# Patient Record
Sex: MALE | Race: WHITE | NOT HISPANIC OR LATINO | Employment: PART TIME | URBAN - NONMETROPOLITAN AREA
[De-identification: names, ages, dates, MRNs, and addresses within clinical notes are randomized per-mention and may not be internally consistent; named-entity substitution may affect disease eponyms.]

---

## 2021-12-09 ENCOUNTER — OFFICE VISIT (OUTPATIENT)
Dept: INTERNAL MEDICINE | Facility: CLINIC | Age: 61
End: 2021-12-09

## 2021-12-09 VITALS
BODY MASS INDEX: 31.39 KG/M2 | WEIGHT: 200 LBS | SYSTOLIC BLOOD PRESSURE: 120 MMHG | HEIGHT: 67 IN | DIASTOLIC BLOOD PRESSURE: 80 MMHG | HEART RATE: 80 BPM | TEMPERATURE: 97.8 F | OXYGEN SATURATION: 98 % | RESPIRATION RATE: 16 BRPM

## 2021-12-09 DIAGNOSIS — Z12.5 PROSTATE CANCER SCREENING: ICD-10-CM

## 2021-12-09 DIAGNOSIS — G47.33 OSA (OBSTRUCTIVE SLEEP APNEA): ICD-10-CM

## 2021-12-09 DIAGNOSIS — Z12.11 COLON CANCER SCREENING: ICD-10-CM

## 2021-12-09 DIAGNOSIS — Z00.00 ROUTINE GENERAL MEDICAL EXAMINATION AT A HEALTH CARE FACILITY: Primary | ICD-10-CM

## 2021-12-09 PROCEDURE — 90471 IMMUNIZATION ADMIN: CPT | Performed by: INTERNAL MEDICINE

## 2021-12-09 PROCEDURE — 99386 PREV VISIT NEW AGE 40-64: CPT | Performed by: INTERNAL MEDICINE

## 2021-12-09 PROCEDURE — 90715 TDAP VACCINE 7 YRS/> IM: CPT | Performed by: INTERNAL MEDICINE

## 2021-12-09 NOTE — PROGRESS NOTES
"Subjective     Patient ID: London Feliz is a 61 y.o. male. Patient is here for management of multiple medical problems.     Chief Complaint   Patient presents with   • Annual Exam     History of Present Illness     Annual Exam    Moved here from [facility].  20 years. Lots of pollution.       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                  The following portions of the patient's history were reviewed and updated as appropriate: allergies, current medications, past family history, past medical history, past social history, past surgical history and problem list.    Review of Systems   Constitutional: Negative for diaphoresis and fatigue.   Psychiatric/Behavioral: Negative for self-injury and sleep disturbance. The patient is not nervous/anxious.    All other systems reviewed and are negative.    No current outpatient medications on file.    Objective      Blood pressure 120/80, pulse 80, temperature 97.8 °F (36.6 °C), resp. rate 16, height 170.2 cm (67\"), weight 90.7 kg (200 lb), SpO2 98 %.    Physical Exam     General Appearance:    Alert, cooperative, no distress, appears stated age   Head:    Normocephalic, without obvious abnormality, atraumatic   Eyes:    PERRL, conjunctiva/corneas clear, EOM's intact   Ears:    Normal TM's and external ear canals, both ears   Nose:   Nares normal, septum midline, mucosa normal, no drainage   or sinus tenderness   Throat:   Lips, mucosa, and tongue normal; teeth and gums normal   Neck:   Supple, symmetrical, trachea midline, no adenopathy;        thyroid:  No enlargement/tenderness/nodules; no carotid    bruit or JVD   Back:     Symmetric, no curvature, ROM normal, no CVA tenderness   Lungs:     Clear to auscultation bilaterally, respirations unlabored   Chest wall:    No tenderness or deformity   Heart:    Regular rate and rhythm, S1 and S2 normal, no murmur,        rub or gallop   Abdomen:     Soft, non-tender, bowel sounds active all four quadrants,     no masses, " no organomegaly   Extremities:   Extremities normal, atraumatic, no cyanosis or edema   Pulses:   2+ and symmetric all extremities   Skin:   Skin color, texture, turgor normal, no rashes or lesions   Lymph nodes:   Cervical, supraclavicular, and axillary nodes normal   Neurologic:   CNII-XII intact. Normal strength, sensation and reflexes       throughout      No results found for this or any previous visit.      Assessment/Plan       Diet discussed.    Exercise. Walking.     Get tdap.  Cologuard. Never had colonoscopy.            Diagnoses and all orders for this visit:    1. Routine general medical examination at a health care facility (Primary)  -     Lipid Panel  -     CBC & Differential  -     Vitamin B12  -     Comprehensive Metabolic Panel  -     TSH  -     Vitamin B6    2. Colon cancer screening  -     Cologuard - Stool, Per Rectum; Future  -     Lipid Panel  -     CBC & Differential  -     Vitamin B12  -     Comprehensive Metabolic Panel  -     TSH  -     Vitamin B6    3. Prostate cancer screening  -     PSA Screen    4. BASIA (obstructive sleep apnea)  -     Home Sleep Study    Other orders  -     Tdap Vaccine Greater Than or Equal To 8yo IM      Return in about 1 year (around 12/9/2022).          There are no Patient Instructions on file for this visit.     Harry Pandya MD    Assessment/Plan

## 2022-02-22 RX ORDER — BROMPHENIRAMINE MALEATE, PSEUDOEPHEDRINE HYDROCHLORIDE, AND DEXTROMETHORPHAN HYDROBROMIDE 2; 30; 10 MG/5ML; MG/5ML; MG/5ML
5 SYRUP ORAL 4 TIMES DAILY PRN
Qty: 118 ML | Refills: 2 | Status: SHIPPED | OUTPATIENT
Start: 2022-02-22 | End: 2023-01-18

## 2022-03-14 RX ORDER — BROMPHENIRAMINE MALEATE, PSEUDOEPHEDRINE HYDROCHLORIDE, AND DEXTROMETHORPHAN HYDROBROMIDE 2; 30; 10 MG/5ML; MG/5ML; MG/5ML
5 SYRUP ORAL 4 TIMES DAILY PRN
Qty: 118 ML | Refills: 2 | Status: SHIPPED | OUTPATIENT
Start: 2022-03-14 | End: 2023-01-18

## 2022-03-16 DIAGNOSIS — R05.9 COUGH: Primary | ICD-10-CM

## 2022-03-16 RX ORDER — GUAIFENESIN AND CODEINE PHOSPHATE 100; 10 MG/5ML; MG/5ML
5 SOLUTION ORAL 3 TIMES DAILY PRN
Qty: 118 ML | Refills: 0 | Status: SHIPPED | OUTPATIENT
Start: 2022-03-16 | End: 2023-03-24

## 2022-08-26 RX ORDER — PERMETHRIN 50 MG/G
1 CREAM TOPICAL ONCE
Qty: 60 G | Refills: 0 | Status: SHIPPED | OUTPATIENT
Start: 2022-08-26 | End: 2022-08-26

## 2023-01-18 ENCOUNTER — OFFICE VISIT (OUTPATIENT)
Dept: INTERNAL MEDICINE | Facility: CLINIC | Age: 63
End: 2023-01-18
Payer: COMMERCIAL

## 2023-01-18 VITALS
HEIGHT: 67 IN | DIASTOLIC BLOOD PRESSURE: 78 MMHG | HEART RATE: 71 BPM | OXYGEN SATURATION: 98 % | RESPIRATION RATE: 15 BRPM | SYSTOLIC BLOOD PRESSURE: 124 MMHG | WEIGHT: 200 LBS | TEMPERATURE: 98.6 F | BODY MASS INDEX: 31.39 KG/M2

## 2023-01-18 DIAGNOSIS — Z00.00 ROUTINE GENERAL MEDICAL EXAMINATION AT A HEALTH CARE FACILITY: Primary | ICD-10-CM

## 2023-01-18 DIAGNOSIS — Z12.5 PROSTATE CANCER SCREENING: ICD-10-CM

## 2023-01-18 PROCEDURE — 99396 PREV VISIT EST AGE 40-64: CPT | Performed by: INTERNAL MEDICINE

## 2023-01-18 NOTE — PROGRESS NOTES
"Subjective     Patient ID: London Feliz is a 62 y.o. male. Patient is here for management of multiple medical problems.     Chief Complaint   Patient presents with   • Annual Exam     Need some cough syrup wants to be proactive.  When body is at rest he is having some vision issues that is more Mirgrane symptoms    Lower back pain since 2018 and wants to know what the best therapy is for it, (pain meds)     History of Present Illness   Annual exam   Needs cough syrup.      vision issues. Laying in bed and had migraine.   3 in the last months. Vision related only.   Bright wavy line that gets worses and responds to tylenol.          The following portions of the patient's history were reviewed and updated as appropriate: allergies, current medications, past family history, past medical history, past social history, past surgical history and problem list.    Review of Systems   Constitutional: Negative for fatigue.   Cardiovascular: Negative for chest pain and leg swelling.   Psychiatric/Behavioral: Negative for self-injury and sleep disturbance.       Current Outpatient Medications:   •  guaiFENesin-codeine (GUAIFENESIN AC) 100-10 MG/5ML liquid, Take 5 mL by mouth 3 (Three) Times a Day As Needed for Cough., Disp: 118 mL, Rfl: 0    Objective      Blood pressure 124/78, pulse 71, temperature 98.6 °F (37 °C), resp. rate 15, height 170.2 cm (67.01\"), weight 90.7 kg (200 lb), SpO2 98 %.    Physical Exam     General Appearance:    Alert, cooperative, no distress, appears stated age   Head:    Normocephalic, without obvious abnormality, atraumatic   Eyes:    PERRL, conjunctiva/corneas clear, EOM's intact   Ears:    Normal TM's and external ear canals, both ears   Nose:   Nares normal, septum midline, mucosa normal, no drainage   or sinus tenderness   Throat:   Lips, mucosa, and tongue normal; teeth and gums normal   Neck:   Supple, symmetrical, trachea midline, no adenopathy;        thyroid:  No " enlargement/tenderness/nodules; no carotid    bruit or JVD   Back:     Symmetric, no curvature, ROM normal, no CVA tenderness   Lungs:     Clear to auscultation bilaterally, respirations unlabored   Chest wall:    No tenderness or deformity   Heart:    Regular rate and rhythm, S1 and S2 normal, no murmur,        rub or gallop   Abdomen:     Soft, non-tender, bowel sounds active all four quadrants,     no masses, no organomegaly   Extremities:   Extremities normal, atraumatic, no cyanosis or edema   Pulses:   2+ and symmetric all extremities   Skin:   Skin color, texture, turgor normal, no rashes or lesions   Lymph nodes:   Cervical, supraclavicular, and axillary nodes normal   Neurologic:   CNII-XII intact. Normal strength, sensation and reflexes       throughout      No results found for this or any previous visit.      Assessment & Plan   koki mild with ahi. And pt has lost wt. No symptoms.        Diagnoses and all orders for this visit:    1. Routine general medical examination at a health care facility (Primary)  -     TSH  -     Comprehensive Metabolic Panel  -     PSA Screen  -     Lipid Panel  -     CBC & Differential    2. Prostate cancer screening  -     TSH  -     Comprehensive Metabolic Panel  -     PSA Screen  -     Lipid Panel  -     CBC & Differential      Return in about 1 year (around 1/18/2024).          There are no Patient Instructions on file for this visit.     Harry Pandya MD    Assessment & Plan

## 2023-03-24 ENCOUNTER — OFFICE VISIT (OUTPATIENT)
Dept: INTERNAL MEDICINE | Facility: CLINIC | Age: 63
End: 2023-03-24
Payer: COMMERCIAL

## 2023-03-24 VITALS
WEIGHT: 195.8 LBS | HEART RATE: 82 BPM | BODY MASS INDEX: 30.73 KG/M2 | OXYGEN SATURATION: 95 % | HEIGHT: 67 IN | TEMPERATURE: 96.5 F | DIASTOLIC BLOOD PRESSURE: 86 MMHG | SYSTOLIC BLOOD PRESSURE: 130 MMHG

## 2023-03-24 DIAGNOSIS — R21 RASH AND NONSPECIFIC SKIN ERUPTION: Primary | ICD-10-CM

## 2023-03-24 DIAGNOSIS — N50.89 SWELLING OF RIGHT TESTICLE: ICD-10-CM

## 2023-03-24 PROCEDURE — 99214 OFFICE O/P EST MOD 30 MIN: CPT | Performed by: NURSE PRACTITIONER

## 2023-03-24 RX ORDER — TRIAMCINOLONE ACETONIDE 1 MG/G
1 CREAM TOPICAL 2 TIMES DAILY
Qty: 45 G | Refills: 3 | Status: SHIPPED | OUTPATIENT
Start: 2023-03-24

## 2023-03-24 NOTE — PROGRESS NOTES
Answers for HPI/ROS submitted by the patient on 3/17/2023  What is the primary reason for your visit?: Other  Please describe your symptoms.: Long story. Last September, I got about 300-400 chigger bites on my ankles, legs and some on chest and back. VERY itchy. The problem is that the itch really doesn't seem to have gone away, and there are rash marks and welts. Not pleasant. The cream the  gave me some time ago really didn't do anything at all.  Have you had these symptoms before?: No  How long have you been having these symptoms?: Greater than 2 weeks  Please list any medications you are currently taking for this condition.: Sometimes I'll take antihistamine to help with itch. Also using Aveeno nightime itch balm. I'll also sometimes use Desitin on a bandaid. Some have faded a bit.  Please describe any probable cause for these symptoms. : Again, the whole thing started last  when I was (nearly) eaten alive by chiggers.      Office Visit      Patient Name: London Feliz  : 1960   MRN: [id number]   Care Team: Patient Care Team:  Harry Pandya MD as PCP - General (Internal Medicine)    Chief Complaint  Rash (Left ankle /Itching )    Subjective     Subjective      London Feliz presents to [facility] for rash and testicle problem.  Complaining of a pruritic erythematous rash over the last 8 to 9 months.  Originally from chiggers or so he thought, did improve with application of Vicks VapoRub.  PCP gave him permethrin cream to try, this did not help at all.  He continues to have a waxing and waning rash that is erythematous with papules and pruritic.  The rash does not drain anything he has not had a fever, joint pain, or excess fatigue.  He has tried over-the-counter creams which have not helped.  The rash is starting to spread, he does have 1 localized area on his trunk the rest are located on his lower extremities.  He has never had a similar rash to this prior  "to this incident.  He is also complaining of possible testicular mass today.  Thought he felt something abnormal on the right testicle.  He denies any pain, hematuria, frequency, urgency, or dysuria.  He has not tried anything for the problem.  Has noticed in the last 3 days..    Objective     Objective   Vital Signs:   /86   Pulse 82   Temp 96.5 °F (35.8 °C)   Ht 170.2 cm (67.01\")   Wt 88.8 kg (195 lb 12.8 oz)   SpO2 95%   BMI 30.66 kg/m²     Physical Exam  Vitals and nursing note reviewed. Exam conducted with a chaperone present (ERIK Saavedra).   Constitutional:       General: He is not in acute distress.     Appearance: Normal appearance. He is not toxic-appearing.   Eyes:      Pupils: Pupils are equal, round, and reactive to light.   Cardiovascular:      Rate and Rhythm: Normal rate and regular rhythm.      Heart sounds: Normal heart sounds. No murmur heard.  Pulmonary:      Effort: Pulmonary effort is normal. No respiratory distress.      Breath sounds: Normal breath sounds. No wheezing.   Genitourinary:     Testes: Cremasteric reflex is present.         Right: Mass, tenderness or swelling not present.         Left: Mass, tenderness or swelling not present.      Epididymis:      Right: Normal.      Left: Normal.   Skin:     General: Skin is warm and dry.      Findings: Rash present. Rash is papular (Papular annular rash on lower extremities not draining or warm rolled borders, 1 area on abdomen as marked).          Neurological:      Mental Status: He is alert.   Psychiatric:         Mood and Affect: Mood normal.         Behavior: Behavior normal.        Assessment / Plan      Assessment & Plan   Problem List Items Addressed This Visit    None  Visit Diagnoses     Rash and nonspecific skin eruption    -  Primary    Relevant Medications    triamcinolone (KENALOG) 0.1 % cream    Unclear etiology, differentials include plaque psoriasis versus eczema.  Low suspicion for fungal infection.  Trial " triamcinolone twice daily at least 7 days if not improving consider trial of clotrimazole and refer to dermatology for further work-up.  Avoid scented lotions, Free and clear laundry detergent recommended, and avoid scratching.    Swelling of right testicle        Relevant Orders    US scrotum and testicles    I do not endorse enlargement or mass of the right testicle, and due to the symptoms he is concerned for underlying testicular cancer.  Advised this would be rare but can further evaluate with ultrasound, noninvasive fairly easy test.  No red flags to consider stat imaging.  Ordered today.  Monitor symptoms.          Follow Up   Return if symptoms worsen or fail to improve.  Patient was given instructions and counseling regarding his condition or for health maintenance advice. Please see specific information pulled into the AVS if appropriate.     JAZZY Che  [facility]

## 2023-03-25 LAB
ALBUMIN SERPL-MCNC: 4 G/DL (ref 3.8–4.8)
ALBUMIN/GLOB SERPL: 1.7 {RATIO} (ref 1.2–2.2)
ALP SERPL-CCNC: 74 IU/L (ref 44–121)
ALT SERPL-CCNC: 21 IU/L (ref 0–44)
AST SERPL-CCNC: 19 IU/L (ref 0–40)
BASOPHILS # BLD AUTO: 0.1 X10E3/UL (ref 0–0.2)
BASOPHILS NFR BLD AUTO: 1 %
BILIRUB SERPL-MCNC: 0.3 MG/DL (ref 0–1.2)
BUN SERPL-MCNC: 13 MG/DL (ref 8–27)
BUN/CREAT SERPL: 12 (ref 10–24)
CALCIUM SERPL-MCNC: 9.7 MG/DL (ref 8.6–10.2)
CHLORIDE SERPL-SCNC: 103 MMOL/L (ref 96–106)
CHOLEST SERPL-MCNC: 200 MG/DL (ref 100–199)
CO2 SERPL-SCNC: 26 MMOL/L (ref 20–29)
CREAT SERPL-MCNC: 1.05 MG/DL (ref 0.76–1.27)
EGFRCR SERPLBLD CKD-EPI 2021: 80 ML/MIN/1.73
EOSINOPHIL # BLD AUTO: 0.2 X10E3/UL (ref 0–0.4)
EOSINOPHIL NFR BLD AUTO: 2 %
ERYTHROCYTE [DISTWIDTH] IN BLOOD BY AUTOMATED COUNT: 13 % (ref 11.6–15.4)
GLOBULIN SER CALC-MCNC: 2.4 G/DL (ref 1.5–4.5)
GLUCOSE SERPL-MCNC: 64 MG/DL (ref 70–99)
HCT VFR BLD AUTO: 49.6 % (ref 37.5–51)
HDLC SERPL-MCNC: 33 MG/DL
HGB BLD-MCNC: 17 G/DL (ref 13–17.7)
IMM GRANULOCYTES # BLD AUTO: 0 X10E3/UL (ref 0–0.1)
IMM GRANULOCYTES NFR BLD AUTO: 0 %
LDLC SERPL CALC-MCNC: 133 MG/DL (ref 0–99)
LYMPHOCYTES # BLD AUTO: 2.6 X10E3/UL (ref 0.7–3.1)
LYMPHOCYTES NFR BLD AUTO: 37 %
MCH RBC QN AUTO: 29.6 PG (ref 26.6–33)
MCHC RBC AUTO-ENTMCNC: 34.3 G/DL (ref 31.5–35.7)
MCV RBC AUTO: 86 FL (ref 79–97)
MONOCYTES # BLD AUTO: 0.6 X10E3/UL (ref 0.1–0.9)
MONOCYTES NFR BLD AUTO: 9 %
NEUTROPHILS # BLD AUTO: 3.5 X10E3/UL (ref 1.4–7)
NEUTROPHILS NFR BLD AUTO: 51 %
PLATELET # BLD AUTO: 286 X10E3/UL (ref 150–450)
POTASSIUM SERPL-SCNC: 5.1 MMOL/L (ref 3.5–5.2)
PROT SERPL-MCNC: 6.4 G/DL (ref 6–8.5)
PSA SERPL-MCNC: 3.6 NG/ML (ref 0–4)
RBC # BLD AUTO: 5.74 X10E6/UL (ref 4.14–5.8)
SODIUM SERPL-SCNC: 141 MMOL/L (ref 134–144)
TRIGL SERPL-MCNC: 189 MG/DL (ref 0–149)
TSH SERPL DL<=0.005 MIU/L-ACNC: 3.91 UIU/ML (ref 0.45–4.5)
VLDLC SERPL CALC-MCNC: 34 MG/DL (ref 5–40)
WBC # BLD AUTO: 7 X10E3/UL (ref 3.4–10.8)

## 2023-05-26 RX ORDER — DEXTROMETHORPHAN HYDROBROMIDE AND PROMETHAZINE HYDROCHLORIDE 15; 6.25 MG/5ML; MG/5ML
5 SYRUP ORAL 4 TIMES DAILY PRN
Qty: 120 ML | Refills: 5 | Status: SHIPPED | OUTPATIENT
Start: 2023-05-26

## 2023-06-01 RX ORDER — DEXTROMETHORPHAN HYDROBROMIDE AND PROMETHAZINE HYDROCHLORIDE 15; 6.25 MG/5ML; MG/5ML
5 SYRUP ORAL 4 TIMES DAILY PRN
Qty: 120 ML | Refills: 5 | OUTPATIENT
Start: 2023-06-01

## 2023-10-25 RX ORDER — NYSTATIN 100000 [USP'U]/G
POWDER TOPICAL 4 TIMES DAILY
Qty: 60 G | Refills: 1 | Status: SHIPPED | OUTPATIENT
Start: 2023-10-25

## 2023-10-25 RX ORDER — FLUCONAZOLE 100 MG/1
100 TABLET ORAL DAILY
Qty: 1 TABLET | Refills: 0 | Status: SHIPPED | OUTPATIENT
Start: 2023-10-25

## 2024-04-22 RX ORDER — DEXTROMETHORPHAN HYDROBROMIDE AND PROMETHAZINE HYDROCHLORIDE 15; 6.25 MG/5ML; MG/5ML
5 SYRUP ORAL 4 TIMES DAILY PRN
Qty: 120 ML | Refills: 5 | OUTPATIENT
Start: 2024-04-22

## 2024-04-22 NOTE — TELEPHONE ENCOUNTER
Sent patient a [employer] message, last office visit was 03/24/2024 informed patient he will need an office visit for refill.

## 2024-07-01 ENCOUNTER — OFFICE VISIT (OUTPATIENT)
Dept: FAMILY MEDICINE CLINIC | Facility: CLINIC | Age: 64
End: 2024-07-01
Payer: COMMERCIAL

## 2024-07-01 VITALS
BODY MASS INDEX: 31.01 KG/M2 | DIASTOLIC BLOOD PRESSURE: 84 MMHG | TEMPERATURE: 98 F | HEIGHT: 67 IN | SYSTOLIC BLOOD PRESSURE: 124 MMHG | RESPIRATION RATE: 16 BRPM | OXYGEN SATURATION: 97 % | HEART RATE: 78 BPM | WEIGHT: 197.6 LBS

## 2024-07-01 DIAGNOSIS — Z12.5 SCREENING PSA (PROSTATE SPECIFIC ANTIGEN): ICD-10-CM

## 2024-07-01 DIAGNOSIS — R05.9 COUGH, UNSPECIFIED TYPE: ICD-10-CM

## 2024-07-01 DIAGNOSIS — L40.9 PSORIASIS: ICD-10-CM

## 2024-07-01 DIAGNOSIS — Z11.59 NEED FOR HEPATITIS C SCREENING TEST: ICD-10-CM

## 2024-07-01 DIAGNOSIS — Z00.00 WELL ADULT EXAM: Primary | ICD-10-CM

## 2024-07-01 DIAGNOSIS — E66.09 CLASS 1 OBESITY DUE TO EXCESS CALORIES WITH BODY MASS INDEX (BMI) OF 30.0 TO 30.9 IN ADULT, UNSPECIFIED WHETHER SERIOUS COMORBIDITY PRESENT: ICD-10-CM

## 2024-07-01 PROCEDURE — 99213 OFFICE O/P EST LOW 20 MIN: CPT | Performed by: FAMILY MEDICINE

## 2024-07-01 PROCEDURE — 99396 PREV VISIT EST AGE 40-64: CPT | Performed by: FAMILY MEDICINE

## 2024-07-01 RX ORDER — MULTIPLE VITAMINS W/ MINERALS TAB 9MG-400MCG
1 TAB ORAL DAILY
COMMUNITY

## 2024-07-02 LAB
ALBUMIN SERPL-MCNC: 4.2 G/DL (ref 3.5–5.2)
ALBUMIN/GLOB SERPL: 1.5 G/DL
ALP SERPL-CCNC: 68 U/L (ref 39–117)
ALT SERPL-CCNC: 16 U/L (ref 1–41)
AST SERPL-CCNC: 17 U/L (ref 1–40)
BILIRUB SERPL-MCNC: 0.3 MG/DL (ref 0–1.2)
BUN SERPL-MCNC: 16 MG/DL (ref 8–23)
BUN/CREAT SERPL: 16.2 (ref 7–25)
CALCIUM SERPL-MCNC: 9.8 MG/DL (ref 8.6–10.5)
CHLORIDE SERPL-SCNC: 101 MMOL/L (ref 98–107)
CHOLEST SERPL-MCNC: 212 MG/DL (ref 0–200)
CO2 SERPL-SCNC: 26.1 MMOL/L (ref 22–29)
CREAT SERPL-MCNC: 0.99 MG/DL (ref 0.76–1.27)
EGFRCR SERPLBLD CKD-EPI 2021: 85.6 ML/MIN/1.73
ERYTHROCYTE [DISTWIDTH] IN BLOOD BY AUTOMATED COUNT: 13 % (ref 12.3–15.4)
GLOBULIN SER CALC-MCNC: 2.8 GM/DL
GLUCOSE SERPL-MCNC: 89 MG/DL (ref 65–99)
HCT VFR BLD AUTO: 50.1 % (ref 37.5–51)
HCV IGG SERPL QL IA: NON REACTIVE
HDLC SERPL-MCNC: 39 MG/DL (ref 40–60)
HGB BLD-MCNC: 16.8 G/DL (ref 13–17.7)
LDLC SERPL CALC-MCNC: 145 MG/DL (ref 0–100)
MCH RBC QN AUTO: 29.4 PG (ref 26.6–33)
MCHC RBC AUTO-ENTMCNC: 33.5 G/DL (ref 31.5–35.7)
MCV RBC AUTO: 87.6 FL (ref 79–97)
PLATELET # BLD AUTO: 303 10*3/MM3 (ref 140–450)
POTASSIUM SERPL-SCNC: 4.4 MMOL/L (ref 3.5–5.2)
PROT SERPL-MCNC: 7 G/DL (ref 6–8.5)
PSA SERPL-MCNC: 5.24 NG/ML (ref 0–4)
RBC # BLD AUTO: 5.72 10*6/MM3 (ref 4.14–5.8)
SODIUM SERPL-SCNC: 139 MMOL/L (ref 136–145)
TRIGL SERPL-MCNC: 153 MG/DL (ref 0–150)
VLDLC SERPL CALC-MCNC: 28 MG/DL (ref 5–40)
WBC # BLD AUTO: 7.25 10*3/MM3 (ref 3.4–10.8)

## 2024-07-05 ENCOUNTER — PATIENT ROUNDING (BHMG ONLY) (OUTPATIENT)
Dept: FAMILY MEDICINE CLINIC | Facility: CLINIC | Age: 64
End: 2024-07-05
Payer: COMMERCIAL

## 2024-07-08 DIAGNOSIS — R97.20 ELEVATED PSA, LESS THAN 10 NG/ML: Primary | ICD-10-CM

## 2024-07-12 PROBLEM — E66.09 CLASS 1 OBESITY DUE TO EXCESS CALORIES WITH BODY MASS INDEX (BMI) OF 30.0 TO 30.9 IN ADULT: Status: ACTIVE | Noted: 2024-07-12

## 2024-07-12 PROBLEM — R05.9 COUGH: Status: ACTIVE | Noted: 2024-07-12

## 2024-07-12 PROBLEM — E66.811 CLASS 1 OBESITY DUE TO EXCESS CALORIES WITH BODY MASS INDEX (BMI) OF 30.0 TO 30.9 IN ADULT: Status: ACTIVE | Noted: 2024-07-12

## 2024-07-12 PROBLEM — L40.9 PSORIASIS: Status: ACTIVE | Noted: 2024-07-12

## 2024-07-12 NOTE — PROGRESS NOTES
Male Physical Note      Date: 2024   Patient Name: London Feliz  : 1960   MRN: [id number]     Chief Complaint:    Chief Complaint   Patient presents with    Establish Care     Pt is here to establish care. Pt needs a PCP closer to home. Pt has some issues he would like to continue care with but nothing major he needs to go over today.     Psoriasis     Pt has some issue with skin but its not a big problem and does not see an derm.     Cough     Pt states for years he gets a cough at night and can not sleep when he gets a cold. Nothing OTC has ever helped except a cough syrup with codeine. No currently having this issue but wanted PCP to be aware that it comes and goes.         History of Present Illness: London Feliz is a 63 y.o. male who is here today for their annual health maintenance and physical.  He is establishing care. Has hx of psoriasis and obesity. Psoriasis currently well controlled with moisturizing lotions and occasional topical CS.     Patient c/o cough that is worse at night. Prevents him from sleeping well. Reports that cough has not really been helped with anything but cough syrup with codeine. He is utilizing mucinex. No hx of asthma. Cough comes and goes.      Subjective      I have reviewed the following portions of the patient's history and these were updated and discussed with the patient as appropriate: past family history, past medical history, past social history, past surgical history, and problem list.      Medications:     Current Outpatient Medications:     multivitamin with minerals tablet tablet, Take 1 tablet by mouth Daily., Disp: , Rfl:     triamcinolone (KENALOG) 0.1 % cream, Apply 1 application topically to the appropriate area as directed 2 (Two) Times a Day., Disp: 45 g, Rfl: 3    Allergies:   No Known Allergies    Immunizations:  Immunization History   Administered Date(s) Administered    Tdap 2021      Colorectal Screening:     Last Completed  "Colonoscopy            COLORECTAL CANCER SCREENING (COLOGUARD - Every 3 Years) Next due on 8/29/2025 08/29/2022  SCANNED - COLOGUARD    08/29/2022  Cologuard - ,    12/09/2021  Cologuard - Stool, Per Rectum                   Diet/Physical activity: Is working to improve this and make this more balanced.      PHQ-9 Depression Screening  Little interest or pleasure in doing things? 0-->not at all   Feeling down, depressed, or hopeless? 0-->not at all   Trouble falling or staying asleep, or sleeping too much?     Feeling tired or having little energy?     Poor appetite or overeating?     Feeling bad about yourself - or that you are a failure or have let yourself or your family down?     Trouble concentrating on things, such as reading the newspaper or watching television?     Moving or speaking so slowly that other people could have noticed? Or the opposite - being so fidgety or restless that you have been moving around a lot more than usual?     Thoughts that you would be better off dead, or of hurting yourself in some way?     PHQ-9 Total Score 0   If you checked off any problems, how difficult have these problems made it for you to do your work, take care of things at home, or get along with other people?       Objective     Physical Exam:  Vital Signs:   Vitals:    07/01/24 1006   BP: 124/84   Pulse: 78   Resp: 16   Temp: 98 °F (36.7 °C)   TempSrc: Temporal   SpO2: 97%   Weight: 89.6 kg (197 lb 9.6 oz)   Height: 170.2 cm (67.01\")     Body mass index is 30.94 kg/m².     Physical Exam  Vitals reviewed.   Constitutional:       General: He is not in acute distress.     Appearance: Normal appearance. He is obese. He is not ill-appearing.   HENT:      Head: Normocephalic and atraumatic.      Right Ear: Tympanic membrane, ear canal and external ear normal.      Left Ear: Tympanic membrane, ear canal and external ear normal.      Nose: Nose normal. No congestion or rhinorrhea.      Mouth/Throat:      Mouth: Mucous " membranes are moist.      Pharynx: No oropharyngeal exudate or posterior oropharyngeal erythema.   Eyes:      General: No scleral icterus.        Right eye: No discharge.         Left eye: No discharge.      Extraocular Movements: Extraocular movements intact.      Conjunctiva/sclera: Conjunctivae normal.   Cardiovascular:      Rate and Rhythm: Normal rate and regular rhythm.      Heart sounds: Normal heart sounds. No murmur heard.  Pulmonary:      Effort: Pulmonary effort is normal. No respiratory distress.      Breath sounds: Normal breath sounds. No stridor. No wheezing or rales.   Abdominal:      General: Bowel sounds are normal. There is no distension.      Palpations: Abdomen is soft. There is no mass.      Tenderness: There is no abdominal tenderness. There is no guarding or rebound.      Hernia: No hernia is present.   Musculoskeletal:         General: Normal range of motion.      Cervical back: Normal range of motion.      Right lower leg: No edema.      Left lower leg: No edema.   Skin:     General: Skin is warm and dry.   Neurological:      Mental Status: He is alert and oriented to person, place, and time. Mental status is at baseline.   Psychiatric:         Mood and Affect: Mood normal.         Behavior: Behavior normal.         Thought Content: Thought content normal.         Judgment: Judgment normal.         Procedures    Assessment / Plan      Assessment/Plan:   Diagnoses and all orders for this visit:    1. Well adult exam (Primary)  -     CBC (No Diff)  -     Comprehensive Metabolic Panel  -     Lipid Panel  -     PSA Screen    2. Screening PSA (prostate specific antigen)  -     PSA Screen    3. Need for hepatitis C screening test  -     Hepatitis C Antibody    4. Class 1 obesity due to excess calories with body mass index (BMI) of 30.0 to 30.9 in adult, unspecified whether serious comorbidity present    5. Cough, unspecified type    6. Psoriasis       Labs as per above  PSA screening.  There is  family history of BPH and prostate cancer.  Diet and exercise recommendations in AVS  Patient up-to-date on colon cancer screening but will need to repeat in 2025.  Cough likely secondary to allergic rhinitis.  Discussed starting fluticasone and oral antihistamine to help with cough.  Patient also utilize OTC H2 blocker.  Will follow-up if this does not help with his cough.  Psoriasis well-controlled at this time but if patient needs refills will refill topical corticosteroid.    BMI is >= 30 and <35. (Class 1 Obesity). The following options were offered after discussion;: exercise counseling/recommendations and nutrition counseling/recommendations       Follow Up:   Return in about 1 year (around 7/1/2025) for Annual.    Healthcare Maintenance:   Counseling provided on exercise, nutrition, age-appropriate screening test, and appropriate lab studies.   London Feliz voices understanding and acceptance of this advice and will call back with any further questions or concerns. AVS with preventive healthcare tips printed for patient.     Pankaj Robin MD   [facility] [place]

## 2024-07-17 RX ORDER — TRIAMCINOLONE ACETONIDE 1 MG/G
1 CREAM TOPICAL 2 TIMES DAILY
Qty: 45 G | Refills: 3 | Status: SHIPPED | OUTPATIENT
Start: 2024-07-17

## 2024-07-23 ENCOUNTER — OFFICE VISIT (OUTPATIENT)
Dept: UROLOGY | Facility: CLINIC | Age: 64
End: 2024-07-23
Payer: COMMERCIAL

## 2024-07-23 VITALS
WEIGHT: 196.4 LBS | HEART RATE: 88 BPM | BODY MASS INDEX: 29.09 KG/M2 | HEIGHT: 69 IN | SYSTOLIC BLOOD PRESSURE: 142 MMHG | TEMPERATURE: 98.1 F | DIASTOLIC BLOOD PRESSURE: 82 MMHG | OXYGEN SATURATION: 98 % | RESPIRATION RATE: 12 BRPM

## 2024-07-23 DIAGNOSIS — R97.20 ELEVATED PSA, LESS THAN 10 NG/ML: Primary | ICD-10-CM

## 2024-07-23 PROCEDURE — 99214 OFFICE O/P EST MOD 30 MIN: CPT | Performed by: NURSE PRACTITIONER

## 2024-07-23 PROCEDURE — 51798 US URINE CAPACITY MEASURE: CPT | Performed by: NURSE PRACTITIONER

## 2024-07-23 NOTE — PROGRESS NOTES
Office Visit Elevated PSA     Patient Name: London Feliz  : 1960   MRN: [id number]     Chief Complaint: Elevated PSA.    Chief Complaint   Patient presents with    Elevated PSA    Establish Care       Referring Provider: Pankaj Robin MD    History of Present Illness: Mr. London Feliz is a 63 y.o.  male who presents with an elevated PSA to   PSA          2024    11:26   PSA   PSA 5.240    .      Patient complains of daytime frequency, intermittency, urgency, weak stream, straining, and nocturia.  He is mostly satisfied with his urinary symptoms.    His IPSS score is 14.    IPSS Questionnaire (AUA-7):  Incomplete emptying  Over the past month, how often have you had a sensation of not emptying your bladder completely after you finished urinating?: Less than 1 time in 5 (24)  Frequency  Over the past month, how often have you had to urinate again less than two hours after you finishied urinating ?: More than half the time (24)  Intermittency  Over the past month, how often have you found you stopped and started again several time when you urinated ?: Less than half the time (24)  Urgency  Over the last month, how often have you found it difficult  have you found it difficult to postpone urination ?: Less than half the time (24)  Weak Stream  Over the past month, how often have you had a weak urinary stream ?: Less than half the time (24)  Straining  Over the past month, how often have you had to push or strain to begin urination ?: Less than half the time (24)  Nocturia  Over the past month, how many times did you most typically get up to urinate from the time you went to bed until the time you got up in the morning ?: 1 time (24)  Quality of life due to urinary symptoms  If you were to spend the rest of your life with your urinary condition the way it is now, how would feel about that?: Mostly satisfied  (24 1527)    Scores  Total IPSS Score: (!) 14 (24 152)  Total Score = Symptomatic Level: Moderately symptomatic: 8-19 (24)        Patient denies fevers, chills, nausea, vomiting, constipation, or flank pain, shortness of breath, leg swelling, calf pain or bone pain.      Past  history is negative for BPH, frequent UTIs, gross hematuria, stones or other renal diseases. Assumes that he has an enlarged prostate but has not been told he does.      Subjective      Review of System: ROS reviewed by me and is negative unless otherwise noted in HPI.      Past Medical History:   Past Medical History:   Diagnosis Date    Cataract diagnosed several years ago    Elevated PSA     GERD (gastroesophageal reflux disease)     Lumbar herniated disc     Migraines        Past Surgical History:   Past Surgical History:   Procedure Laterality Date    VARICOCELE EXCISION      surgery       Family History:   Family History   Problem Relation Age of Onset    Migraines Father     Arthritis Mother     Colon cancer Mother     Migraines Mother     Cancer Mother         colon    Prostate cancer Neg Hx     Kidney cancer Neg Hx      Has no family history of prostate cancer.    Social History:   Social History     Socioeconomic History    Marital status:     Number of children: 7   Tobacco Use    Smoking status: Former     Types: Cigars     Quit date:      Years since quittin.5     Passive exposure: Never    Smokeless tobacco: Never    Tobacco comments:     used to smoke a cigar now and then; gave it up   Vaping Use    Vaping status: Never Used   Substance and Sexual Activity    Alcohol use: Not Currently     Comment: glass wine once in while    Drug use: Never    Sexual activity: Yes     Partners: Female     Comment: NA       Medications:     Current Outpatient Medications:     Multiple Vitamins-Minerals (EMERGEN-C VITAMIN C PO), Take  by mouth., Disp: , Rfl:     multivitamin with minerals tablet  "tablet, Take 1 tablet by mouth Daily., Disp: , Rfl:     triamcinolone (KENALOG) 0.1 % cream, Apply 1 Application topically to the appropriate area as directed 2 (Two) Times a Day., Disp: 45 g, Rfl: 3    Allergies:   No Known Allergies    Objective     Physical Exam:   Vital Signs:   Vitals:    07/23/24 1524   BP: 142/82   BP Location: Left arm   Patient Position: Sitting   Cuff Size: Adult   Pulse: 88   Resp: 12   Temp: 98.1 °F (36.7 °C)   TempSrc: Temporal   SpO2: 98%   Weight: 89.1 kg (196 lb 6.4 oz)   Height: 175.1 cm (68.94\")     Body mass index is 29.06 kg/m².   Physical Exam  Vitals and nursing note reviewed. Exam conducted with a chaperone present.   Constitutional:       General: He is not in acute distress.     Appearance: Normal appearance. He is not ill-appearing.   HENT:      Head: Normocephalic and atraumatic.   Pulmonary:      Effort: Pulmonary effort is normal.   Abdominal:      Hernia: There is no hernia in the left inguinal area or right inguinal area.   Genitourinary:     Pubic Area: No rash.       Penis: Normal and circumcised.       Testes: Normal.      Epididymis:      Right: Normal.      Left: Normal.      Prostate: Enlarged (slightly firm). Not tender and no nodules present.      Rectum: Normal anal tone.   Skin:     General: Skin is warm and dry.   Neurological:      General: No focal deficit present.      Mental Status: He is alert and oriented to person, place, and time.   Psychiatric:         Mood and Affect: Mood normal.         Behavior: Behavior normal.        LABS  Brief Urine Lab Results       None          Lab Results   Component Value Date    PSA 5.240 (H) 07/01/2024    PSA 3.6 03/24/2023     Images:   No Images in the past 120 days found.    PVR 40 ml.      Assessment / Plan      Assessment  Mr. Feliz is a 63 y.o.  male who presents with elevated PSA.  This is a new diagnosis of uncertain clinical prognosis. PVR is 40 ml today.      I explained to the patient that an " elevated PSA is a marker of risk of prostate cancer and a ExoDx score would be the next step in evaluation.  The ExoDX score will help determine the probability of finding an aggressive prostate cancer if we were to biopsy.     Mr. Feliz consented to Exo DX testing today.  Will have him follow up with me in 2-3 weeks to review results.      Diagnoses and all orders for this visit:    1. Elevated PSA, less than 10 ng/ml (Primary)    Follow Up:   Return for 2-3 weeks with Caroline to review ExoDx results .    JAZZY Ocasio, MSN, FNP-C  [facility]

## 2024-08-12 RX ORDER — FLUCONAZOLE 200 MG/1
200 TABLET ORAL WEEKLY
Qty: 4 TABLET | Refills: 0 | Status: SHIPPED | OUTPATIENT
Start: 2024-08-12 | End: 2024-09-03

## 2024-08-12 RX ORDER — NYSTATIN 100000 [USP'U]/G
POWDER TOPICAL 2 TIMES DAILY
Qty: 60 G | Refills: 1 | Status: SHIPPED | OUTPATIENT
Start: 2024-08-12

## 2024-11-05 ENCOUNTER — OFFICE VISIT (OUTPATIENT)
Dept: FAMILY MEDICINE CLINIC | Facility: CLINIC | Age: 64
End: 2024-11-05
Payer: COMMERCIAL

## 2024-11-05 VITALS
OXYGEN SATURATION: 98 % | HEART RATE: 78 BPM | DIASTOLIC BLOOD PRESSURE: 82 MMHG | TEMPERATURE: 97.5 F | RESPIRATION RATE: 16 BRPM | BODY MASS INDEX: 29.12 KG/M2 | HEIGHT: 69 IN | SYSTOLIC BLOOD PRESSURE: 138 MMHG | WEIGHT: 196.6 LBS

## 2024-11-05 DIAGNOSIS — J30.9 CHRONIC ALLERGIC RHINITIS: ICD-10-CM

## 2024-11-05 DIAGNOSIS — H81.09 MENIERE'S DISEASE, UNSPECIFIED LATERALITY: ICD-10-CM

## 2024-11-05 DIAGNOSIS — R42 VERTIGO: Primary | ICD-10-CM

## 2024-11-05 PROCEDURE — 99214 OFFICE O/P EST MOD 30 MIN: CPT | Performed by: FAMILY MEDICINE

## 2024-11-05 RX ORDER — MECLIZINE HCL 12.5 MG 12.5 MG/1
12.5 TABLET ORAL 3 TIMES DAILY PRN
Qty: 30 TABLET | Refills: 1 | Status: SHIPPED | OUTPATIENT
Start: 2024-11-05

## 2024-11-05 NOTE — PROGRESS NOTES
"    Office Note     Name: London Feliz  : 1960   MRN: [id number]     Chief Complaint:  Dizziness (Pt is here for a follow up on last visit. He feels Flonase and Claritin are helping but not to the extent he needs it to. Still having dizziness and pressure. Pt would like to know what else he can do besides the allergy medications and nasal spray.  )    Subjective     History of Present Illness:  London Feliz is a 64 y.o. male who presents today for dizziness.  He did undergo vestibular rehab and states that it did not help very much.  He continues to have postnasal drip.  Dizziness has improved.  He has noticed some tinnitus with each episode of dizziness send 1 dizziness resolved sodas tinnitus.  No hearing loss.  He feels Flonase and Claritin are helping but he continues to have postnasal drip and cough.    I have reviewed the following portions of the patient's history and these were updated and discussed with the patient as appropriate: past family history, past medical history, past social history, past surgical history, and problem list.     Objective     Vital Signs  /82   Pulse 78   Temp 97.5 °F (36.4 °C) (Temporal)   Resp 16   Ht 175.1 cm (68.94\")   Wt 89.2 kg (196 lb 9.6 oz)   SpO2 98%   BMI 29.08 kg/m²   Estimated body mass index is 29.08 kg/m² as calculated from the following:    Height as of this encounter: 175.1 cm (68.94\").    Weight as of this encounter: 89.2 kg (196 lb 9.6 oz).    Physical Exam  Vitals reviewed.   Constitutional:       General: He is not in acute distress.     Appearance: Normal appearance. He is not ill-appearing.   HENT:      Head: Normocephalic.      Right Ear: Tympanic membrane and external ear normal.      Left Ear: Tympanic membrane, ear canal and external ear normal.      Ears:      Comments: For inhaler likely dog hair noted in right canal     Nose:      Right Turbinates: Enlarged.      Left Turbinates: Enlarged.      Mouth/Throat:      Comments: " Thick PND noted  Eyes:      Extraocular Movements: Extraocular movements intact.   Cardiovascular:      Rate and Rhythm: Normal rate.   Pulmonary:      Effort: Pulmonary effort is normal.      Breath sounds: Normal breath sounds.   Neurological:      Mental Status: He is alert and oriented to person, place, and time.   Psychiatric:         Mood and Affect: Mood normal.         Behavior: Behavior normal.            Assessment and Plan     Diagnoses and all orders for this visit:    1. Vertigo (Primary)  -     meclizine (ANTIVERT) 12.5 MG tablet; Take 1 tablet by mouth 3 (Three) Times a Day As Needed for Dizziness.  Dispense: 30 tablet; Refill: 1    2. Meniere's disease, unspecified laterality    3. Chronic allergic rhinitis      Vertigo 2/2 BPPV versus Ménière's disease.  Given that patient has not found much relief with vestibular rehab will start meclizine when vertigo symptoms occur  Continue Claritin and fluticasone  May need to consider ENT referral      Discussion Summary:     Discussed plan of care in detail with patient today. Patient was encouraged to keep me informed of any acute changes, lack of improvement, or any new concerning symptoms.  Patient is also aware of reasons to seek emergent care. Patient verbalized understanding and agrees with plan of care.    This visit was billed based on time.  I spent 30 minutes caring for London Feliz on this date of service. This time includes time spent by me in the following activities:preparing for the visit, reviewing tests, obtaining and/or reviewing a separately obtained history, performing a medically appropriate examination and/or evaluation , counseling and educating the patient/family/caregiver, ordering medications, tests, or procedures, referring and communicating with other health care professionals , documenting information in the medical record, independently interpreting results and communicating that information with the patient/family/caregiver,  and care coordination.    Follow Up  Return in about 8 months (around 7/5/2025) for Annual physical.      Please note that portions of this note may have been completed with a voice recognition program.     Pankaj Robin MD, MPH  [facility]

## 2024-11-05 NOTE — PATIENT INSTRUCTIONS
For cough take: Antihistamine daily, and pepcid (Famotidine) twice a day    I will send meclizine for you to use when you have dizziness and ear crackling

## 2024-12-09 RX ORDER — TRIAMCINOLONE ACETONIDE 1 MG/G
1 CREAM TOPICAL 2 TIMES DAILY
Qty: 45 G | Refills: 3 | OUTPATIENT
Start: 2024-12-09

## 2024-12-18 RX ORDER — TRIAMCINOLONE ACETONIDE 1 MG/G
1 CREAM TOPICAL 2 TIMES DAILY
Qty: 45 G | Refills: 3 | Status: CANCELLED | OUTPATIENT
Start: 2024-12-18

## 2024-12-18 RX ORDER — TRIAMCINOLONE ACETONIDE 1 MG/G
1 CREAM TOPICAL 2 TIMES DAILY
Qty: 45 G | Refills: 3 | Status: SHIPPED | OUTPATIENT
Start: 2024-12-18

## 2024-12-18 NOTE — TELEPHONE ENCOUNTER
Rx Refill Note  Requested Prescriptions     Pending Prescriptions Disp Refills    triamcinolone (KENALOG) 0.1 % cream 45 g 3     Sig: Apply 1 Application topically to the appropriate area as directed 2 (Two) Times a Day.      Last office visit with prescribing clinician: 11/5/2024   Last telemedicine visit with prescribing clinician: Visit date not found   Next office visit with prescribing clinician: 7/7/2025                         Would you like a call back once the refill request has been completed: [] Yes [] No    If the office needs to give you a call back, can they leave a voicemail: [] Yes [] No    Italia Olsen MA  12/18/24, 09:51 EST

## 2025-06-02 RX ORDER — ECONAZOLE NITRATE 10 MG/G
1 CREAM TOPICAL DAILY
Qty: 85 G | Refills: 0 | Status: SHIPPED | OUTPATIENT
Start: 2025-06-02

## 2025-06-02 RX ORDER — FLUCONAZOLE 200 MG/1
200 TABLET ORAL WEEKLY
Qty: 4 TABLET | Refills: 0 | Status: SHIPPED | OUTPATIENT
Start: 2025-06-02 | End: 2025-06-24

## 2025-06-02 RX ORDER — NYSTATIN 100000 [USP'U]/G
POWDER TOPICAL 2 TIMES DAILY
Qty: 60 G | Refills: 1 | Status: SHIPPED | OUTPATIENT
Start: 2025-06-02

## 2025-07-07 ENCOUNTER — OFFICE VISIT (OUTPATIENT)
Dept: FAMILY MEDICINE CLINIC | Facility: CLINIC | Age: 65
End: 2025-07-07
Payer: COMMERCIAL

## 2025-07-07 VITALS
DIASTOLIC BLOOD PRESSURE: 80 MMHG | SYSTOLIC BLOOD PRESSURE: 122 MMHG | WEIGHT: 182.8 LBS | OXYGEN SATURATION: 98 % | HEIGHT: 69 IN | TEMPERATURE: 98.2 F | RESPIRATION RATE: 16 BRPM | HEART RATE: 81 BPM | BODY MASS INDEX: 27.08 KG/M2

## 2025-07-07 DIAGNOSIS — Z59.86 FINANCIAL INSECURITY DUE TO MEDICAL EXPENSES: ICD-10-CM

## 2025-07-07 DIAGNOSIS — J30.9 CHRONIC ALLERGIC RHINITIS: ICD-10-CM

## 2025-07-07 DIAGNOSIS — B37.2 INTERTRIGO OF GENITOCRURAL REGION DUE TO CANDIDA SPECIES: ICD-10-CM

## 2025-07-07 DIAGNOSIS — E66.3 OVERWEIGHT (BMI 25.0-29.9): ICD-10-CM

## 2025-07-07 DIAGNOSIS — Z00.00 WELL ADULT EXAM: Primary | ICD-10-CM

## 2025-07-07 DIAGNOSIS — Z12.5 SCREENING PSA (PROSTATE SPECIFIC ANTIGEN): ICD-10-CM

## 2025-07-07 DIAGNOSIS — Z53.20 COLON CANCER SCREENING DECLINED: ICD-10-CM

## 2025-07-07 DIAGNOSIS — H65.93 BILATERAL OTITIS MEDIA WITH EFFUSION: ICD-10-CM

## 2025-07-07 RX ORDER — NYSTATIN 100000 [USP'U]/G
POWDER TOPICAL 2 TIMES DAILY
Qty: 60 G | Refills: 1 | Status: SHIPPED | OUTPATIENT
Start: 2025-07-07

## 2025-07-07 SDOH — ECONOMIC STABILITY - INCOME SECURITY: FINANCIAL INSECURITY: Z59.86

## 2025-07-07 NOTE — PATIENT INSTRUCTIONS
Your provider at [facility] has just given you a general check-up. Your health care team recommends the following prescriptions for a healthy future:    Diet  The right foods will help you live a longer, healthier life. Many diseases such as heart disease, diabetes, and high blood pressure can be prevented or controlled through a healthy diet. Eat a variety of foods. Maintain a healthy weight. Choose a diet low in carbohydrates and processed foods and higher in protein. Eat plenty of fresh vegetables and fruits. Use sugar only in moderation.    Exercise  All kinds of exercise will help you feel better and maintain a healthy weight. Regular exercise will also help you control your blood pressure and cholesterol, and strengthen your heart and muscles. Even daily activities such as housework, walking, or raking leaves will help. Pick activities that you enjoy, that fit into your routine and that you can do with a friend. Try for a total of 30 minutes per day, 5 days a week. Include exercises designed to improve flexibility and balance.    Obesity  Maintain a healthy weight. Obesity is a risk factor for medical problems including diabetes, heart disease, high blood pressure, and high cholesterol. You will find helpful weight loss & exercise information on the National Institutes of Health Website: AIM for a Healthy Weight.    Don’t Smoke  If you smoke, quit. It is the best thing that you can do to stay healthy. Pick a day to quit. If you fail the first time, don’t give up. Keep trying and learn from your experience. You can succeed and live a longer, healthier life.    Alcohol and Drug Abuse  If you drink alcohol, do so only in moderation - no more than 1 drink daily for women or 2 drinks daily for men. Do not drink at all if you are pregnant or may be in the future. Do not drink alcohol before or while driving a motor vehicle. Don’t use illegal (street) drugs of any kind, at any time. Use  prescription drugs only as directed. Use non-prescription drugs only as instructed on the label.    HIV / AIDS Prevention  You can reduce your risk of getting HIV by not having sex, by having sex with only one, mutually faithful, uninfected partner or by using a latex condom correctly every time you have sex. You can reduce your risk of getting HIV by not using illegal (street) drugs or sharing needles and syringes.    Breast Cancer Screening  All women should begin having mammograms every 1-2 years by age 40. Check with your doctor to determine when you should start having routine mammography. You should check your own breasts regularly for problems.    Cervical Cancer Screening  Human papillomavirus (HPV) infection and smoking are risk factors for cervical cancer. Sexually active women between 21 and 65 years of age should be screened with a PAP smear at least every 1-3 years.    Colon Cancer Screening  You should have colon screening if you are 45 years of age or older. Earlier screening may be indicated if you have had colon polyps, a family history of colon cancer, familial polyposis or have had breast, ovarian or uterine cancer yourself.     Skin Cancer  Use sun protective measures such as hats, shirts and staying in the shade. Use sunscreen when you can’t avoid the sun. Avoid artificial tanning lights.    Osteoporosis Prevention  For women over 65 and others at risk of osteoporosis, your diet should include Calcium (5113-6311) & Vitamin D (800 IU) daily.    Folic Acid  All women who are capable of becoming pregnant should consume folic acid 0.8 mg daily to reduce risk of birth defects of the brain and spinal cord.    Occupational Health  Safety officers and human resources representatives at your job can assist you in techniques to reduce repetitive motion and lifting injuries. Be alert for hazards in your workplace and follow all safety rules.    Violence  Physical/mental abuse of children, spouses, partners  and the elderly is an all too common occurrence. Please ask for help if you feel you may be the victim or perpetrator of this abuse.    Immunizations  Immunizations (“shots”) may prevent serious diseases. At age 65 you should consider a pneumococcal (“pneumonia”) shot as well as an influenza (“flu”) shot every year. Adults with some chronic medical conditions may need to start pneumococcal and influenza shots earlier. College bound students who plan on living in dorms should consider the meningococcal vaccine unless they’ve been immunized already. The Tdap vaccine (Tetanus-diphtheria-acellular pertussis) is recommended by the CDC every 10 years between 11-64 years. The Varicella vaccine is also recommended by the CDC if there is no history of prior infection.    Injury Prevention Tips  --Always wear safety belts while in a car.  --Never drive after drinking alcohol.  --Always wear a safety helmet while riding on a motorcycle, bicycle or all-terrain vehicle.  --Use smoke detectors in your home. Change the batteries every year and check to see if they work every month.  --If you have firearms in the home make sure that the firearm and the ammunition are locked up.  --Prevent falls by older adults. Repair slippery or uneven walking surfaces, improve poor lighting and install secure railings on all stairways.  --Secure all chemicals and pools from children.    Advance Directives  You have the right under State Law to make decisions concerning medical care, including the right to sign an Advance Directive to physicians and/or a durable Medical Power of .

## 2025-07-08 LAB
ALBUMIN SERPL-MCNC: 4.2 G/DL (ref 3.5–5.2)
ALBUMIN/GLOB SERPL: 1.5 G/DL
ALP SERPL-CCNC: 70 U/L (ref 39–117)
ALT SERPL-CCNC: 12 U/L (ref 1–41)
AST SERPL-CCNC: 17 U/L (ref 1–40)
BILIRUB SERPL-MCNC: <0.2 MG/DL (ref 0–1.2)
BUN SERPL-MCNC: 15 MG/DL (ref 8–23)
BUN/CREAT SERPL: 17.6 (ref 7–25)
CALCIUM SERPL-MCNC: 9.6 MG/DL (ref 8.6–10.5)
CHLORIDE SERPL-SCNC: 105 MMOL/L (ref 98–107)
CHOLEST SERPL-MCNC: 231 MG/DL (ref 0–200)
CO2 SERPL-SCNC: 25.3 MMOL/L (ref 22–29)
CREAT SERPL-MCNC: 0.85 MG/DL (ref 0.76–1.27)
EGFRCR SERPLBLD CKD-EPI 2021: 97 ML/MIN/1.73
ERYTHROCYTE [DISTWIDTH] IN BLOOD BY AUTOMATED COUNT: 13 % (ref 12.3–15.4)
GLOBULIN SER CALC-MCNC: 2.8 GM/DL
GLUCOSE SERPL-MCNC: 95 MG/DL (ref 65–99)
HCT VFR BLD AUTO: 49 % (ref 37.5–51)
HDLC SERPL-MCNC: 44 MG/DL (ref 40–60)
HGB BLD-MCNC: 16.3 G/DL (ref 13–17.7)
LDLC SERPL CALC-MCNC: 150 MG/DL (ref 0–100)
MCH RBC QN AUTO: 29.6 PG (ref 26.6–33)
MCHC RBC AUTO-ENTMCNC: 33.3 G/DL (ref 31.5–35.7)
MCV RBC AUTO: 88.9 FL (ref 79–97)
PLATELET # BLD AUTO: 287 10*3/MM3 (ref 140–450)
POTASSIUM SERPL-SCNC: 4.2 MMOL/L (ref 3.5–5.2)
PROT SERPL-MCNC: 7 G/DL (ref 6–8.5)
PSA SERPL-MCNC: 5.35 NG/ML (ref 0–4)
RBC # BLD AUTO: 5.51 10*6/MM3 (ref 4.14–5.8)
SODIUM SERPL-SCNC: 140 MMOL/L (ref 136–145)
TRIGL SERPL-MCNC: 205 MG/DL (ref 0–150)
VLDLC SERPL CALC-MCNC: 37 MG/DL (ref 5–40)
WBC # BLD AUTO: 6.53 10*3/MM3 (ref 3.4–10.8)

## 2025-07-20 PROBLEM — Z59.86 FINANCIAL INSECURITY DUE TO MEDICAL EXPENSES: Status: ACTIVE | Noted: 2025-07-20

## 2025-07-20 PROBLEM — B37.2 INTERTRIGO OF GENITOCRURAL REGION DUE TO CANDIDA SPECIES: Status: ACTIVE | Noted: 2025-07-20

## 2025-07-20 PROBLEM — Z53.20 COLON CANCER SCREENING DECLINED: Status: ACTIVE | Noted: 2025-07-20

## 2025-07-20 PROBLEM — J30.9 CHRONIC ALLERGIC RHINITIS: Status: ACTIVE | Noted: 2025-07-20

## 2025-07-20 PROBLEM — E66.3 OVERWEIGHT (BMI 25.0-29.9): Status: ACTIVE | Noted: 2025-07-20

## 2025-07-20 NOTE — PROGRESS NOTES
Male Physical Note      Date: 2025   Patient Name: London Feliz  : 1960   MRN: [id number]     Chief Complaint:    Chief Complaint   Patient presents with    Annual Exam     Pt is here for annual physical      History of Present Illness  London Feliz is a 64 y.o. male who is here today for their annual health maintenance and physical. The patient presents for evaluation of hay fever, and rash.    He has severe hay fever causing a clogged ear, with no eustachian tube issues.    Worsening rash and itching at night since temperature increased. He has been using the cream and powder twice daily for 4 weeks but has not applied any topical treatments today.    He has a history of slowly progressing cataracts and is considering surgical intervention to improve vision and reduce the need for prescription glasses.    He discontinued urology appointments, feels well, and increased physical activity. He will transition to Medicare on 10/01/2025.    He lost weight from 182 to 178 pounds since 2025 by avoiding processed foods and sugar. His acid reflux, psoriasis, eustachian tube problem, and headaches have resolved. He eats a healthy breakfast, lunch, and supper without feeling hungry.    Subjective      I have reviewed the following portions of the patient's history and these were updated and discussed with the patient as appropriate: past family history, past medical history, past social history, past surgical history, and problem list.      Medications:     Current Outpatient Medications:     econazole nitrate (SPECTAZOLE) 1 % cream, Apply 1 Application topically to the appropriate area as directed Daily., Disp: 85 g, Rfl: 0    meclizine (ANTIVERT) 12.5 MG tablet, Take 1 tablet by mouth 3 (Three) Times a Day As Needed for Dizziness., Disp: 30 tablet, Rfl: 1    nystatin (MYCOSTATIN) 625641 UNIT/GM powder, Apply  topically to the appropriate area as directed 2 (Two) Times a Day., Disp: 60 g, Rfl: 1     "triamcinolone (KENALOG) 0.1 % cream, Apply 1 Application topically to the appropriate area as directed 2 (Two) Times a Day., Disp: 45 g, Rfl: 3    Allergies:   No Known Allergies    Immunizations:  Immunization History   Administered Date(s) Administered    Tdap 12/09/2021      Colorectal Screening:     Last Completed Colonoscopy    This patient has no relevant Health Maintenance data.      Declines today. Wants to wait until on Medicare    PHQ-9 Depression Screening  Little interest or pleasure in doing things? Not at all   Feeling down, depressed, or hopeless? Not at all   PHQ-2 Total Score 0      Objective     Physical Exam:  Vital Signs:   Vitals:    07/07/25 1415   BP: 122/80   Pulse: 81   Resp: 16   Temp: 98.2 °F (36.8 °C)   TempSrc: Oral   SpO2: 98%   Weight: 82.9 kg (182 lb 12.8 oz)   Height: 175.1 cm (68.94\")     Body mass index is 27.04 kg/m².     Physical Exam  Vitals reviewed.   Constitutional:       General: He is not in acute distress.     Appearance: Normal appearance. He is not ill-appearing.   HENT:      Head: Normocephalic and atraumatic.      Right Ear: Ear canal and external ear normal. Tympanic membrane is bulging.      Left Ear: Ear canal and external ear normal. Tympanic membrane is bulging.      Nose: Nose normal. No congestion or rhinorrhea.      Mouth/Throat:      Mouth: Mucous membranes are moist.      Pharynx: No oropharyngeal exudate or posterior oropharyngeal erythema.   Eyes:      General: No scleral icterus.        Right eye: No discharge.         Left eye: No discharge.      Extraocular Movements: Extraocular movements intact.      Conjunctiva/sclera: Conjunctivae normal.   Cardiovascular:      Rate and Rhythm: Normal rate and regular rhythm.      Heart sounds: Normal heart sounds. No murmur heard.  Pulmonary:      Effort: Pulmonary effort is normal. No respiratory distress.      Breath sounds: Normal breath sounds. No stridor. No wheezing, rhonchi or rales.   Chest:      Chest wall: " No tenderness.   Abdominal:      General: Bowel sounds are normal. There is no distension.      Palpations: Abdomen is soft. There is no mass.      Tenderness: There is no abdominal tenderness. There is no guarding or rebound.      Hernia: No hernia is present.   Musculoskeletal:         General: Normal range of motion.      Cervical back: Normal range of motion and neck supple. No rigidity or tenderness.      Right lower leg: No edema.      Left lower leg: No edema.   Lymphadenopathy:      Cervical: No cervical adenopathy.   Skin:     General: Skin is warm and dry.   Neurological:      Mental Status: He is alert and oriented to person, place, and time. Mental status is at baseline.   Psychiatric:         Mood and Affect: Mood normal.         Behavior: Behavior normal.         Thought Content: Thought content normal.         Judgment: Judgment normal.              Assessment / Plan      Assessment/Plan:   Diagnoses and all orders for this visit:    1. Well adult exam (Primary)  -     Lipid Panel  -     CBC (No Diff)  -     Comprehensive Metabolic Panel    2. Screening PSA (prostate specific antigen)  -     Cancel: PSA Screen  -     PSA Screen    3. Chronic allergic rhinitis    4. Bilateral otitis media with effusion    5. Intertrigo of genitocrural region due to Candida species  -     nystatin (MYCOSTATIN) 335203 UNIT/GM powder; Apply  topically to the appropriate area as directed 2 (Two) Times a Day.  Dispense: 60 g; Refill: 1    6. Colon cancer screening declined    7. Financial insecurity due to medical expenses    8. Overweight (BMI 25.0-29.9)      Assessment & Plan  - Patient is Current tobacco non-user  - Reviewed care gaps and recommended screening tests with patient.  - Patient needs Colon cancer screening and Prostate cancer screening, PSA ordered today, patient declines colon cancer screen. Wants to wait until on Medicare which will be in a few months.     Chronic allergic rhinitis  Otitis media with  effusion  - Improved symptoms of acid reflux, psoriasis, eustachian tube issues, and headaches after dietary changes  - Small amount of fluid in the ear noted  - Continue current diet    Intertrigo  - Rash worsening at night, possibly due to cream overuse  - Rash primarily on scrotum and testicles  - Discontinue CS cream, use nystatin powder twice daily  - Switch to regular powder if symptoms persist during summer    Cataracts  - Slow-growing cataracts  - See ophthalmologist for evaluation and potential surgery after Medicare transition    Health maintenance  - Transition to Medicare on 10/01/2025  - PSA test and routine labs scheduled        BMI is >= 25 and <30. (Overweight) The following options were offered after discussion;: Information on healthy weight added to patient's after visit summary.       Follow Up:   No follow-ups on file.    Healthcare Maintenance:   Counseling provided on exercise, nutrition, age-appropriate screening test, and appropriate lab studies.   London Tray voices understanding and acceptance of this advice and will call back with any further questions or concerns. AVS with preventive healthcare tips printed for patient.     Patient or patient representative verbalized consent for the use of Ambient Listening during the visit with  Pankaj Robin MD for chart documentation. 7/20/2025  16:30 EDT     Pankaj Robin MD   [facility] [place] [facility] [place]